# Patient Record
Sex: MALE | Race: WHITE
[De-identification: names, ages, dates, MRNs, and addresses within clinical notes are randomized per-mention and may not be internally consistent; named-entity substitution may affect disease eponyms.]

---

## 2019-07-04 ENCOUNTER — HOSPITAL ENCOUNTER (INPATIENT)
Dept: HOSPITAL 74 - JER | Age: 44
LOS: 4 days | Discharge: HOME | DRG: 501 | End: 2019-07-08
Attending: NURSE PRACTITIONER | Admitting: GENERAL ACUTE CARE HOSPITAL
Payer: SELF-PAY

## 2019-07-04 VITALS — BODY MASS INDEX: 38.2 KG/M2

## 2019-07-04 DIAGNOSIS — B95.4: ICD-10-CM

## 2019-07-04 DIAGNOSIS — E11.65: ICD-10-CM

## 2019-07-04 DIAGNOSIS — N50.819: ICD-10-CM

## 2019-07-04 DIAGNOSIS — B95.7: ICD-10-CM

## 2019-07-04 DIAGNOSIS — N52.9: ICD-10-CM

## 2019-07-04 DIAGNOSIS — N49.2: Primary | ICD-10-CM

## 2019-07-04 DIAGNOSIS — I10: ICD-10-CM

## 2019-07-04 LAB
ALBUMIN SERPL-MCNC: 3.1 G/DL (ref 3.4–5)
ALP SERPL-CCNC: 129 U/L (ref 45–117)
ALT SERPL-CCNC: 27 U/L (ref 13–61)
ANION GAP SERPL CALC-SCNC: 7 MMOL/L (ref 8–16)
APPEARANCE UR: CLEAR
AST SERPL-CCNC: 21 U/L (ref 15–37)
BILIRUB SERPL-MCNC: 0.4 MG/DL (ref 0.2–1)
BILIRUB UR STRIP.AUTO-MCNC: NEGATIVE MG/DL
BUN SERPL-MCNC: 13.2 MG/DL (ref 7–18)
CALCIUM SERPL-MCNC: 9 MG/DL (ref 8.5–10.1)
CHLORIDE SERPL-SCNC: 102 MMOL/L (ref 98–107)
CO2 SERPL-SCNC: 27 MMOL/L (ref 21–32)
COLOR UR: YELLOW
CREAT SERPL-MCNC: 1 MG/DL (ref 0.55–1.3)
DEPRECATED RDW RBC AUTO: 14 % (ref 11.9–15.9)
GLUCOSE SERPL-MCNC: 287 MG/DL (ref 74–106)
HCT VFR BLD CALC: 37.1 % (ref 35.4–49)
HGB BLD-MCNC: 12.7 GM/DL (ref 11.7–16.9)
KETONES UR QL STRIP: NEGATIVE
LEUKOCYTE ESTERASE UR QL STRIP.AUTO: NEGATIVE
MCH RBC QN AUTO: 27.6 PG (ref 25.7–33.7)
MCHC RBC AUTO-ENTMCNC: 34.1 G/DL (ref 32–35.9)
MCV RBC: 80.8 FL (ref 80–96)
NITRITE UR QL STRIP: NEGATIVE
PH UR: 5.5 [PH] (ref 5–8)
PLATELET # BLD AUTO: 291 K/MM3 (ref 134–434)
PMV BLD: 7.6 FL (ref 7.5–11.1)
POTASSIUM SERPLBLD-SCNC: 4.5 MMOL/L (ref 3.5–5.1)
PROT SERPL-MCNC: 7.4 G/DL (ref 6.4–8.2)
PROT UR QL STRIP: (no result)
PROT UR QL STRIP: NEGATIVE
RBC # BLD AUTO: 4.6 M/MM3 (ref 4–5.6)
SODIUM SERPL-SCNC: 136 MMOL/L (ref 136–145)
SP GR UR: 1.02 (ref 1.01–1.03)
UROBILINOGEN UR STRIP-MCNC: 1 MG/DL (ref 0.2–1)
WBC # BLD AUTO: 7.1 K/MM3 (ref 4–10)

## 2019-07-04 PROCEDURE — 0V950ZX DRAINAGE OF SCROTUM, OPEN APPROACH, DIAGNOSTIC: ICD-10-PCS | Performed by: UROLOGY

## 2019-07-04 RX ADMIN — INSULIN ASPART SCH UNITS: 100 INJECTION, SOLUTION INTRAVENOUS; SUBCUTANEOUS at 16:31

## 2019-07-04 RX ADMIN — PIPERACILLIN SODIUM,TAZOBACTAM SODIUM SCH MLS/HR: 3; .375 INJECTION, POWDER, FOR SOLUTION INTRAVENOUS at 19:06

## 2019-07-04 RX ADMIN — INSULIN ASPART SCH UNITS: 100 INJECTION, SOLUTION INTRAVENOUS; SUBCUTANEOUS at 22:25

## 2019-07-04 RX ADMIN — INSULIN ASPART SCH UNITS: 100 INJECTION, SOLUTION INTRAVENOUS; SUBCUTANEOUS at 17:49

## 2019-07-04 NOTE — HP
Admitting History and Physical





- Admission


Chief Complaint: swelling and pain in right testicle


History of Present Illness: 





44yo M hx DM, HTN, and obesity c/o R testicular swelling x8 days. Pt popped a 

small pimple on his R testicular sac 9 days ago with a needle (cleaned with 

alcohol). The following day his R sac swelled. His PCP prescirbed him 10 days 

of Amox-Clav 875-125 2x/day 7 days ago which pt has been taking consistently. 

Pt states the swelling and inflammation has decreased a little but it's become 

hard. Endorses pain when standing 2/2 weight and pain with palpation of the R 

sac, R sac swelling, mild redness and itching, and R groin pain. Denies penile 

pain, penile discharge, dysuria, hematuria, black/blue discoloration, abdominal 

pain, new erectile dysfunction (has hx of ED), STDs (last sex 2-3mo ago), other 

cysts/abscesses/bumps, hx of cysts/abscesses, hx of similar sx, recent abx use, 

F/C, N/V/D. Pt's girlfriend states pt was sweating one night a few nights ago 

so he possibly had a fever. Pt has tried tylenol and advil with minimal relief.


07/04/19 09:46


History Source: Patient


Limitations to Obtaining History: No Limitations





- Past Medical History


Cardiovascular: Yes: HTN


Renal/: Yes: Other (hx of Erectile Dysfunction)


Endocrine: Yes: Diabetes Mellitus





- Past Surgical History


Past Surgical History: Yes: Orchiectomy (left)





- Smoking History


Smoking history: Never smoked


Have you smoked in the past 12 months: No





- Social History


Usual Living Arrangement: Yes: With Spouse


Occupation: contractor


History of Recent Travel: No





Home Medications





- Allergies


Allergies/Adverse Reactions: 


 Allergies











Allergy/AdvReac Type Severity Reaction Status Date / Time


 


No Known Allergies Allergy   Verified 07/04/19 07:49














- Home Medications


Home Medications: 


Ambulatory Orders





Amlodipine Besylate 10 mg PO DAILY 07/04/19 


Glipizide [Glipizide ER] 10 mg PO DAILY 07/04/19 


Hydrochlorothiazide [Hctz -] 12.5 mg PO DAILY 07/04/19 


Metformin HCl [Glucophage] 1,000 mg PO BID 07/04/19 











Family Disease History





- Family Disease History


Family History: Denies





Review of Systems





- Review of Systems


Constitutional: reports: Chills (questionable 2 nights  ago-waife claims he had 

chills)


Eyes: reports: No Symptoms


HENT: reports: No Symptoms


Neck: reports: No Symptoms


Cardiovascular: reports: No Symptoms


Respiratory: reports: No Symptoms


Gastrointestinal: reports: No Symptoms


Genitourinary: reports: Testicular Pain, Testicular Swelling (right)


Breasts: reports: No Symptoms Reported


Musculoskeletal: reports: No Symptoms


Integumentary: reports: No Symptoms


Neurological: reports: No Symptoms


Endocrine: reports: No Symptoms


Hematology/Lymphatic: reports: No Symptoms


Psychiatric: reports: No Symptoms


Pain Intensity: 5





Physical Examination


Vital Signs: 


 Vital Signs











Temperature  98.8 F   07/04/19 07:51


 


Pulse Rate  100 H  07/04/19 07:51


 


Respiratory Rate  16   07/04/19 07:51


 


Blood Pressure  135/83   07/04/19 07:51


 


O2 Sat by Pulse Oximetry (%)  99   07/04/19 07:51











Constitutional: Yes: Well Nourished, No Distress, Calm


Eyes: Yes: WNL


HENT: Yes: WNL, Atraumatic, Normocephalic


Neck: Yes: WNL, Supple, Trachea Midline


Cardiovascular: Yes: WNL, Regular Rate and Rhythm


Respiratory: Yes: WNL, Regular, CTA Bilaterally


Gastrointestinal: Yes: WNL, Normal Bowel Sounds, Soft


...Rectal Exam: Yes: Deferred


Renal/: Yes: Scrotal Edema


Musculoskeletal: Yes: WNL


Extremities: Yes: WNL


Edema: No


Peripheral Pulses WNL: Yes


Integumentary: Yes: WNL


Neurological: Yes: WNL, Alert, Oriented


...Motor Strength: WNL


Psychiatric: Yes: WNL, Alert, Oriented


Labs: 


 CBC, BMP





 07/04/19 09:51 





 07/04/19 09:51 











Imaging





- Results


Ultrasound: Report Reviewed (U/S scrotum: The right testis is not visualized. 

The patient states a history of remote orchiectomy at age 4. Within the right 

scrotal sac laterally note is made of a nonspecific approximately 4.3 x 3 x 2 

cm complex structure possibly representing an abscess with internal debris. 

Increased vascularity is seen along the periphery of this complex structure. 

Correlate clinically. The left testis appears unremarkable. No Doppler evidence 

of left testicular torsion, sensitivity 85%. The left epididymis appears 

unremarkable. No hydrocele is noted.)





Problem List





- Problems


(1) Testicle pain


Code(s): N50.819 - TESTICULAR PAIN, UNSPECIFIED   





(2) Testicle swelling


Assessment/Plan: 


may apple ice to scrotum


elevate testicles


Code(s): N50.89 - OTHER SPECIFIED DISORDERS OF THE MALE GENITAL ORGANS   





(3) Testicular abscess


Assessment/Plan: 


 consultation requested


change abx to ceftriaxone 2g qd





Code(s): N45.4 - ABSCESS OF EPIDIDYMIS OR TESTIS   





(4) HTN (hypertension)


Assessment/Plan: 


normotensive


maintain on norvasc and hctz


Code(s): I10 - ESSENTIAL (PRIMARY) HYPERTENSION   





(5) Diabetes


Assessment/Plan: 


HgbA1c 10.9


BGM AC/qHS


novolog sliding scale


if BGM remains elevated consider starting long acting insulin


diabetic diet


diabetic counseling


Code(s): E11.9 - TYPE 2 DIABETES MELLITUS WITHOUT COMPLICATIONS   





(6) Hemoglobin A1c greater than 9.0%


Code(s): R73.09 - OTHER ABNORMAL GLUCOSE   





(7) Prophylactic measure


Assessment/Plan: 


FEN


diabetic diet


monitor electrolytes





DVT proph


early ambulation





Dispo


Maintain as in patient


full code


discharge planning


Code(s): Z29.9 - ENCOUNTER FOR PROPHYLACTIC MEASURES, UNSPECIFIED   





Visit type





- Emergency Visit


Emergency Visit: Yes


ED Registration Date: 07/04/19


Care time: The patient presented to the Emergency Department on the above date 

and was hospitalized for further evaluation of their emergent condition.





- New Patient


This patient is new to me today: Yes


Date on this admission: 07/04/19





- Critical Care


Critical Care patient: No

## 2019-07-04 NOTE — PROC
Procedure Note


Procedure: 





Incision and drainage of scrotal abscess





Under sterile conditions with local anesthesia using 1% lidocaine, right 

scrotal wall abscess was incised and drained with a #10 scalpel blade. Large 

amount of pus was expressed and drained.  Wound culture sent.

## 2019-07-04 NOTE — PDOC
Attending Attestation





- Resident


Resident Name: Kelly Pérez





- ED Attending Attestation


I have performed the following: I have examined & evaluated the patient, The 

case was reviewed & discussed with the resident, I agree w/resident's findings 

& plan, Exceptions are as noted





- HPI


HPI: 





07/04/19 09:42


Mr Brown is a 44 yo M with a h/o HTN. DM who presents to the ER with a 

complaint of scrotal swelling and pain


Pt reports that 10 days ago, he popped a bump on his scrutum with a needle


Since then, he has noted progressive worsening swelling and tenderness, 

particularly when he walks


He was seen by a PMD who initiated Augmentin which he has been on for the past 

week (intermittently he has missed doses)


He denies fevers but his life partner notes that he had chills and felt warm 

one night


Pain is mostly when walking


No dysuria, No discharge


No prior history of abscesses


No exposure to health care system


No nausea, vomiting, diarrhea


07/04/19 10:18





PMH: DM, HTN


PSH: Right orchiectomy age 4








- Physicial Exam


PE: 





07/04/19 09:28





GENERAL: The patient is in no acute distress.


ENT: Ears normal, nares patent, oropharynx clear without exudates.  Moist 

mucous membranes 


NECK: Normal range of motion, supple 


LUNGS: Breath sounds equal, clear to auscultation bilaterally.  No wheezes, and 

no crackles.


HEART:Regular rate and rhythm, normal S1 and S2 without murmur, rub or gallop.


ABDOMEN: Soft, nontender  


: Right scrotum FIRM, ERYTHEMATOUS, WARM, blanching erythema 5 cm in diameter)


NO necrosis


NO lesions noted in perineum


Cremasteric reflex intact. Normal penis. No L scrotal mass, urethral discharge, 

penile lesion


EXTREMITIES: Normal range of motion, no edema.   


NEUROLOGICAL: Cranial nerves II through XII grossly intact.  Normal speech.  No 

focal neurological deficits. 


SKIN: see above


07/04/19 09:53





07/04/19 09:54





07/04/19 10:19


 





- Medical Decision Making





07/04/19 09:53


44 yo M p/w right scrotal swelling and erythema which has worsened despite abx


Local abscess noted


Will do:


Labs


Cultures


Scrotal US


Consult 


(Pt concerning given DM)





07/04/19 10:18


 Laboratory Tests











  07/04/19





  09:51


 


WBC  7.1


 


Hgb  12.7


 


Hct  37.1


 


Plt Count  291











07/04/19 10:43


U/S demonstrates 4.3 x 3 x 2cm complex structure likely representing an abscess 

with internal debris, increased vascularity seen along the periphery of this 

structure, no evidence of torsion


07/04/19 10:44


 Laboratory Tests











  07/04/19





  09:51


 


BUN  13.2


 


Creatinine  1.0


 


Random Glucose  287 H








 consult


Recommends admission and IV abx


Pt agrees with this plan


Will admit





clinical impression: scrotal abscess, initial presentation


Scrotal cellulitis, initial presentation


Failure of outpatient abx, initial presentation

## 2019-07-04 NOTE — PDOC
History of Present Illness





- General


Chief Complaint: Abscess Boil


Stated Complaint: TESTICLE PAIN AND SWELLING


Time Seen by Provider: 07/04/19 09:02





- History of Present Illness


Initial Comments: 





07/04/19 09:35


44yo M hx DM, HTN, and obesity c/o R testicular swelling x8 days. Pt popped a 

small pimple on his R testicular sac 9 days ago with a needle (cleaned with 

alcohol). The following day his R sac swelled. His PCP prescirbed him 10 days 

of Amox-Clav 875-125 2x/day 7 days ago which pt has been taking consistently. 

Pt states the swelling and inflammation has decreased a little but it's become 

hard. Endorses pain when standing 2/2 weight and pain with palpation of the R 

sac, R sac swelling, mild redness and itching, and R groin pain. Denies penile 

pain, penile discharge, dysuria, hematuria, black/blue discoloration, abdominal 

pain, new erectile dysfunction (has hx of ED), STDs (last sex 2-3mo ago), other 

cysts/abscesses/bumps, hx of cysts/abscesses, hx of similar sx, recent abx use, 

F/C, N/V/D. Pt's girlfriend states pt was sweating one night a few nights ago 

so he possibly had a fever. Pt has tried tylenol and advil with minimal relief.


07/04/19 09:46








Past History





- Past Medical History


Allergies/Adverse Reactions: 


 Allergies











Allergy/AdvReac Type Severity Reaction Status Date / Time


 


No Known Allergies Allergy   Verified 07/04/19 07:49











Home Medications: 


Ambulatory Orders





Amlodipine Besylate 10 mg PO DAILY 07/04/19 


Glipizide [Glipizide ER] 10 mg PO DAILY 07/04/19 


Hydrochlorothiazide [Hctz -] 12.5 mg PO DAILY 07/04/19 


Metformin HCl [Glucophage] 1,000 mg PO BID 07/04/19 








COPD: No


Diabetes: Yes


HTN: Yes





- Immunization History


Immunization Up to Date: No





- Suicide/Smoking/Psychosocial Hx


Smoking History: Never smoked


Have you smoked in the past 12 months: No


Information on smoking cessation initiated: No


Hx Alcohol Use: No


Drug/Substance Use Hx: No





**Review of Systems





- Review of Systems


Comments:: 





07/04/19 09:53


Constitutional: Positive for diaphoresis. Negative for fever, chills, fatigue. 


HENT: Negative for sore throat, rhinorrhea, congestion.


Eyes: Negative for visual disturbance. 


Respiratory: Negative for shortness of breath, cough, and wheezing. 


Cardiovascular: Negative for chest pain, palpitations and leg swelling. 


Gastrointestinal: Negative for abdominal pain, blood in stool, constipation, 

diarrhea, nausea and vomiting. 


Genitourinary: Positive for R testicular swelling, erythema, pruritus, and 

pain. Negative for dysuria, hematuria, penile discharge, penile pain, flank 

pain. 


Musculoskeletal: Negative for back pain, neck pain, myalgias.


Skin: Positive for erythema overlying R testicle. 


Neurological: Negative for light-headedness, dizziness, syncope, weakness, 

numbness and headaches. 


Psychiatric/Behavioral: Negative for behavioral problems and confusion. 





*Physical Exam





- Vital Signs


 Last Vital Signs











Temp Pulse Resp BP Pulse Ox


 


 98.8 F   100 H  16   135/83   99 


 


 07/04/19 07:51  07/04/19 07:51  07/04/19 07:51  07/04/19 07:51  07/04/19 07:51














- Physical Exam


Comments: 





07/04/19 09:56


GEN: Alert, NAD, comfortable-appearing, obese.


HEENT: PERRL, EOMI. MMM. Oropharynx clear, pink, and moist.


PULM: No resp distress. CTAB, no wheezes, rales, or rhonchi.


CV: Regular rate and rhythm. No murmurs, rubs, or gallops.


ABD: Soft, non-distended, no TTP. Normoactive BS. No rebound tenderness or 

guarding. No CVA tenderness.


: R scrotal edema with 2" diameter erythema and induration, TTP R scrotum. 

Cremasteric reflex intact. 2+ femoral pulses. Normal penis. No L scrotal mass, 

urethral discharge, penile lesion, varicocele, blistering, blue/black/grey 

discoloration, or evidence of Rosalind gangrene.


MSK: No gross deformities.


BACK: no TTP of c/t/l-spine.


NEURO: AAOx3. PERRL. No gross CN deficits. Strength and sensation grossly 

intact throughout.


PSYCH: Normal mood and thought pattern.


SKIN: Erythema overlying R testicle.





07/04/19 10:10








ED Treatment Course





- LABORATORY


CBC & Chemistry Diagram: 


 07/04/19 09:51





 07/04/19 09:51





- RADIOLOGY


Radiology Studies Ordered: 














 Category Date Time Status


 


 SCROTUM AND CONTENTS US [US] Stat Ultrasound  07/04/19 09:32 Ordered














Medical Decision Making





- Medical Decision Making





07/04/19 09:49


44yo M hx DM, HTN, and obesity presents with worsening R scrotal edema, 

induration, and cellulitis x8 days despite 7 days of Amox-Clav. No evidence of 

testicular torsion, Rosalind gangrene, or sepsis. Most likely cyst/abscess - 

get scrotal U/S to evaluate extent and basic labs and BCx to rule out 

bacteremia.


- Morphine for pain


- Labs: CBC, CMP, UA/UC, BCx


- Scrotal U/S


- Pending lab results, consult Urology to discuss IV abx, I&D, and dispo


07/04/19 10:17





07/04/19 11:02


U/S scrotum: The right testis is not visualized. The patient states a history 

of remote orchiectomy at age 4. Within the right scrotal sac laterally note is 

made of a nonspecific approximately 4.3 x 3 x 2 cm complex structure possibly 

representing an abscess with internal debris. Increased vascularity is seen 

along the periphery of this complex structure. Correlate clinically. The left 

testis appears unremarkable. No Doppler evidence of left testicular torsion, 

sensitivity 85%. The left epididymis appears unremarkable. No hydrocele is 

noted. 


CBC/CMP resulted: of note, WBC 7.1, gluc 287


1103: urine sample taken. Nurse with pt to give morphine.


07/04/19 11:32


UA returned: 3+ glucose


Pt feeling and appearing better s/p morphine.


07/04/19 12:13


Talked to ID. Recommended 1g Vanc and 3.375 Zosyn. Ordered. Waiting for call 

from admitting hospitalist.


07/04/19 12:23


Talked to [facility] NP for admission. Order to admit placed.








*DC/Admit/Observation/Transfer


Diagnosis at time of Disposition: 


 Abscess








- Discharge Dispostion


Condition at time of disposition: Fair


Decision to Admit order: Yes





- Referrals





- Patient Instructions





- Post Discharge Activity

## 2019-07-05 LAB
ALBUMIN SERPL-MCNC: 1.4 G/DL (ref 3.4–5)
ALP SERPL-CCNC: 120 U/L (ref 45–117)
ALT SERPL-CCNC: 25 U/L (ref 13–61)
ANION GAP SERPL CALC-SCNC: 9 MMOL/L (ref 8–16)
AST SERPL-CCNC: 12 U/L (ref 15–37)
BASOPHILS # BLD: 1 % (ref 0–2)
BILIRUB SERPL-MCNC: 0.5 MG/DL (ref 0.2–1)
BUN SERPL-MCNC: 19.5 MG/DL (ref 7–18)
CALCIUM SERPL-MCNC: 8.9 MG/DL (ref 8.5–10.1)
CHLORIDE SERPL-SCNC: 101 MMOL/L (ref 98–107)
CO2 SERPL-SCNC: 25 MMOL/L (ref 21–32)
CREAT SERPL-MCNC: 1.1 MG/DL (ref 0.55–1.3)
DEPRECATED RDW RBC AUTO: 14.1 % (ref 11.9–15.9)
EOSINOPHIL # BLD: 1.2 % (ref 0–4.5)
GLUCOSE SERPL-MCNC: 176 MG/DL (ref 74–106)
HCT VFR BLD CALC: 36.1 % (ref 35.4–49)
HGB BLD-MCNC: 12.4 GM/DL (ref 11.7–16.9)
INR BLD: 1.06 (ref 0.83–1.09)
LYMPHOCYTES # BLD: 29.5 % (ref 8–40)
MAGNESIUM SERPL-MCNC: 2.4 MG/DL (ref 1.8–2.4)
MCH RBC QN AUTO: 27.8 PG (ref 25.7–33.7)
MCHC RBC AUTO-ENTMCNC: 34.3 G/DL (ref 32–35.9)
MCV RBC: 81 FL (ref 80–96)
MONOCYTES # BLD AUTO: 6.2 % (ref 3.8–10.2)
NEUTROPHILS # BLD: 62.1 % (ref 42.8–82.8)
PLATELET # BLD AUTO: 278 K/MM3 (ref 134–434)
PMV BLD: 7.7 FL (ref 7.5–11.1)
POTASSIUM SERPLBLD-SCNC: 4.4 MMOL/L (ref 3.5–5.1)
PROT SERPL-MCNC: 7 G/DL (ref 6.4–8.2)
PT PNL PPP: 12.5 SEC (ref 9.7–13)
RBC # BLD AUTO: 4.46 M/MM3 (ref 4–5.6)
SODIUM SERPL-SCNC: 135 MMOL/L (ref 136–145)
WBC # BLD AUTO: 5.9 K/MM3 (ref 4–10)

## 2019-07-05 RX ADMIN — INSULIN ASPART SCH UNITS: 100 INJECTION, SOLUTION INTRAVENOUS; SUBCUTANEOUS at 06:08

## 2019-07-05 RX ADMIN — AMLODIPINE BESYLATE SCH MG: 10 TABLET ORAL at 09:12

## 2019-07-05 RX ADMIN — PIPERACILLIN SODIUM,TAZOBACTAM SODIUM SCH MLS/HR: 3; .375 INJECTION, POWDER, FOR SOLUTION INTRAVENOUS at 02:02

## 2019-07-05 RX ADMIN — PIPERACILLIN SODIUM,TAZOBACTAM SODIUM SCH MLS/HR: 3; .375 INJECTION, POWDER, FOR SOLUTION INTRAVENOUS at 18:15

## 2019-07-05 RX ADMIN — INSULIN ASPART SCH UNITS: 100 INJECTION, SOLUTION INTRAVENOUS; SUBCUTANEOUS at 21:59

## 2019-07-05 RX ADMIN — GLIPIZIDE SCH MG: 10 TABLET, FILM COATED, EXTENDED RELEASE ORAL at 09:31

## 2019-07-05 RX ADMIN — METFORMIN HYDROCHLORIDE SCH MG: 500 TABLET ORAL at 18:14

## 2019-07-05 RX ADMIN — INSULIN ASPART SCH UNITS: 100 INJECTION, SOLUTION INTRAVENOUS; SUBCUTANEOUS at 18:18

## 2019-07-05 RX ADMIN — PIPERACILLIN SODIUM,TAZOBACTAM SODIUM SCH MLS/HR: 3; .375 INJECTION, POWDER, FOR SOLUTION INTRAVENOUS at 11:15

## 2019-07-05 RX ADMIN — HYDROCHLOROTHIAZIDE SCH MG: 12.5 CAPSULE ORAL at 09:12

## 2019-07-05 RX ADMIN — INSULIN ASPART SCH UNITS: 100 INJECTION, SOLUTION INTRAVENOUS; SUBCUTANEOUS at 12:21

## 2019-07-05 RX ADMIN — VANCOMYCIN HYDROCHLORIDE SCH MLS/2 HR: 1.25 INJECTION, POWDER, LYOPHILIZED, FOR SOLUTION INTRAVENOUS at 14:12

## 2019-07-05 NOTE — PN
Progress Note, Physician


History of Present Illness: 





42yo M hx DM, HTN, and obesity c/o R testicular swelling x8 days. Pt popped a 

small pimple on his R scrotum 9 days ago with a needle (cleaned with alcohol). 

The following day his R scrotum swelled. His PCP prescirbed him 10 days of Amox-

Clav 875-125 2x/day 7 days ago which pt has been taking consistently. Pt states 

the swelling and inflammation has decreased a little but it's become hard. 

Endorses pain when standing 2/2 weight and pain with palpation of the R scrotum

, swelling, mild redness and itching, and R groin pain. Denies penile pain, 

penile discharge, dysuria, hematuria, black/blue discoloration, abdominal pain, 

new erectile dysfunction (has hx of ED), STDs (last sex 2-3mo ago), other cysts/

abscesses/bumps, hx of cysts/abscesses, hx of similar sx, recent abx use, F/C, N

/V/D. Wife states pt was sweating one night a few nights ago so he possibly had 

a fever. Pt has tried tylenol and advil with minimal relief.





- Current Medication List


Current Medications: 


Active Medications





Amlodipine Besylate (Norvasc -)  10 mg PO DAILY [facility]


Hydrochlorothiazide (Hctz -)  12.5 mg PO DAILY [facility]


Piperacillin Sod/Tazobactam (Sod 3.375 gm/ Dextrose)  50 mls @ 100 mls/hr IVPB 

Q8H-IV [facility]; Protocol


Piperacillin Sod/Tazobactam (Sod 3.375 gm/ Dextrose)  50 mls @ 100 mls/hr IVPB 

Q8H-IV [facility]


   Stop: 07/05/19 10:29


   Last Admin: 07/05/19 02:02 Dose:  100 mls/hr


Insulin Aspart (Novolog Vial Sliding Scale -)  1 vial SQ ACHS [facility]; Protocol


   Last Admin: 07/05/19 06:08 Dose:  8 units


Ketorolac Tromethamine (Toradol Injection -)  30 mg IVPUSH Q6H PRN


   PRN Reason: PAIN LEVEL 4 - 6


   Last Admin: 07/04/19 20:35 Dose:  30 mg


Vancomycin HCl (Vancomycin (Pre-Docked))  1,000 mg IVPB BID [facility]; Protocol











- Objective


Vital Signs: 


 Vital Signs











Temperature  98.5 F   07/05/19 06:00


 


Pulse Rate  72   07/05/19 06:00


 


Respiratory Rate  18   07/05/19 06:00


 


Blood Pressure  110/77   07/05/19 06:00


 


O2 Sat by Pulse Oximetry (%)  97   07/04/19 14:00











Constitutional: Yes: Well Nourished, No Distress, Calm


Eyes: Yes: WNL, Conjunctiva Clear, EOM Intact


HENT: Yes: WNL, Atraumatic, Normocephalic


Neck: Yes: WNL, Supple, Trachea Midline


Cardiovascular: Yes: WNL, Regular Rate and Rhythm


Respiratory: Yes: WNL, Regular, CTA Bilaterally


Gastrointestinal: Yes: WNL, Normal Bowel Sounds, Soft


...Rectal Exam: Yes: Deferred


Genitourinary: Yes: Other (Right scrotum I&D-small incision draining serosang 

fluid)


Musculoskeletal: Yes: WNL


Extremities: Yes: WNL


Edema: No


Peripheral Pulses WNL: Yes


Integumentary: Yes: Incision


Wound/Incision: Yes: Draining (sersanguinous drainage)


Neurological: Yes: WNL, Alert, Oriented


Labs: 


 INR, PTT











INR  1.06  (0.83-1.09)   07/05/19  06:31    














Problem List





- Problems


(1) Testicle pain


Code(s): N50.819 - TESTICULAR PAIN, UNSPECIFIED   





(2) Testicle swelling


Assessment/Plan: 


may apple ice to scrotum


elevate testicles


Code(s): N50.89 - OTHER SPECIFIED DISORDERS OF THE MALE GENITAL ORGANS   





(3) Testicular abscess


Assessment/Plan: 


S/P I&D or right scrotal abcess


continue abx


appreciate ID consultation


ABD pads to collect drainage , but leave open-no tape.


Code(s): N45.4 - ABSCESS OF EPIDIDYMIS OR TESTIS   





(4) HTN (hypertension)


Assessment/Plan: 


normotensive


maintain on norvasc and hctz


Code(s): I10 - ESSENTIAL (PRIMARY) HYPERTENSION   





(5) Diabetes


Assessment/Plan: 


HgbA1c 10.9


BGM AC/qHS


novolog sliding scale


restart metformin and glipizide


diabetic diet


diabetic counseling


Code(s): E11.9 - TYPE 2 DIABETES MELLITUS WITHOUT COMPLICATIONS   





(6) Hemoglobin A1c greater than 9.0%


Code(s): R73.09 - OTHER ABNORMAL GLUCOSE   





(7) Prophylactic measure


Assessment/Plan: 


FEN


diabetic diet


monitor electrolytes





DVT proph


early ambulation





Dispo


Maintain as in patient


full code


discharge planning


Code(s): Z29.9 - ENCOUNTER FOR PROPHYLACTIC MEASURES, UNSPECIFIED   





Visit type





- Emergency Visit


Emergency Visit: Yes


ED Registration Date: 07/04/19


Care time: The patient presented to the Emergency Department on the above date 

and was hospitalized for further evaluation of their emergent condition.





- New Patient


This patient is new to me today: No





- Critical Care


Critical Care patient: No





- Discharge Referral


Referred to [facility] No

## 2019-07-05 NOTE — CON.ID
Consult


Consult Specialty:: infectious diseases


Reason for Consultation:: scrotal abscess





- History of Present Illness


Chief Complaint: pain and swelling of the scrotum


History of Present Illness: 





44yo M hx DM, HTN, and obesity c/o R testicular swelling x8 days. Pt popped a 

small pimple on his R testicular sac 9 days ago with a needle (cleaned with 

alcohol). The following day his R sac swelled. His PCP prescirbed him 10 days 

of Amox-Clav 875-125 2x/day 7 days ago which pt has been taking consistently. 

Pt states the swelling and inflammation has decreased a little but it's become 

hard. 


patient didnot improve,came to the hospital and was taken to the operating room 

and underwent draiange of the abscess


currently he feels well and stable


also his blood cx are now growing bacteria





- History Source


History Provided By: Patient


Limitations to Obtaining History: No Limitations





- Past Medical History


Cardio/Vascular: Yes: HTN


Renal/: Yes: Other (hx of Erectile Dysfunction)


Endocrine: Yes: Diabetes Mellitus





- Past Surgical History


Past Surgical History: Yes: Orchiectomy (left)





- Alcohol/Substance Use


Hx Alcohol Use: No





- Smoking History


Smoking history: Never smoked


Have you smoked in the past 12 months: No





- Social History


Occupation: contractor


History of Recent Travel: No





Home Medications





- Allergies


Allergies/Adverse Reactions: 


 Allergies











Allergy/AdvReac Type Severity Reaction Status Date / Time


 


No Known Allergies Allergy   Verified 07/04/19 07:49














- Home Medications


Home Medications: 


Ambulatory Orders





Amlodipine Besylate 10 mg PO DAILY 07/04/19 


Glipizide [Glipizide ER] 10 mg PO DAILY 07/04/19 


Hydrochlorothiazide [Hctz -] 12.5 mg PO DAILY 07/04/19 


Metformin HCl [Glucophage] 1,000 mg PO BID 07/04/19 











Review of Systems





- Review of Systems


Constitutional: reports: No Symptoms


Eyes: reports: No Symptoms


HENT: reports: No Symptoms


Neck: reports: No Symptoms


Cardiovascular: reports: No Symptoms


Respiratory: reports: No Symptoms


Gastrointestinal: reports: No Symptoms, Other (scrotal infection/abscess)


Genitourinary: reports: Other (scrotal swelling/abscess)


Musculoskeletal: reports: No Symptoms


Integumentary: reports: No Symptoms


Neurological: reports: No Symptoms


Endocrine: reports: No Symptoms


Hematology/Lymphatic: reports: No Symptoms


Psychiatric: reports: No Symptoms





Physical Exam


Vital Signs: 


 Vital Signs











Temperature  98.4 F   07/05/19 10:00


 


Pulse Rate  79   07/05/19 10:00


 


Respiratory Rate  18   07/05/19 10:00


 


Blood Pressure  114/85   07/05/19 10:00


 


O2 Sat by Pulse Oximetry (%)  97   07/04/19 14:00











Constitutional: Yes: Well Nourished, No Distress, Calm


Cardiovascular: Yes: Regular Rate and Rhythm


Respiratory: Yes: Regular, CTA Bilaterally


Gastrointestinal: Yes: Normal Bowel Sounds, Soft


Renal/: Yes: Scrotal Edema (resolved.wound looks good)


Musculoskeletal: Yes: WNL


Extremities: Yes: WNL


Wound/Incision: Yes: Clean/Dry, Open to air


Neurological: Yes: Alert, Oriented


Psychiatric: Yes: Alert, Oriented


Labs: 


 CBC, BMP





 07/05/19 06:31 





 07/05/19 06:31 











Imaging





- Results


Ultrasound: Report Reviewed, Image Reviewed





Assessment/Plan





Problem List





- Problems


(1) Testicle pain


Code(s): N50.819 - TESTICULAR PAIN, UNSPECIFIED   





(2) Testicle swelling


Code(s): N50.89 - OTHER SPECIFIED DISORDERS OF THE MALE GENITAL ORGANS   





(3) Testicular abscess


Code(s): N45.4 - ABSCESS OF EPIDIDYMIS OR TESTIS   





(4) HTN (hypertension)


Code(s): I10 - ESSENTIAL (PRIMARY) HYPERTENSION   





(5) Diabetes


Code(s): E11.9 - TYPE 2 DIABETES MELLITUS WITHOUT COMPLICATIONS   





(6) Hemoglobin A1c greater than 9.0%


Code(s): R73.09 - OTHER ABNORMAL GLUCOSE   








plan


continue abx


await for cx report to be back


close watch


rest as per the team


will repeat blood cx tomorrow

## 2019-07-06 LAB
ALBUMIN SERPL-MCNC: 3 G/DL (ref 3.4–5)
ALP SERPL-CCNC: 122 U/L (ref 45–117)
ALT SERPL-CCNC: 29 U/L (ref 13–61)
ANION GAP SERPL CALC-SCNC: 9 MMOL/L (ref 8–16)
AST SERPL-CCNC: 13 U/L (ref 15–37)
BASOPHILS # BLD: 0.9 % (ref 0–2)
BILIRUB SERPL-MCNC: 0.4 MG/DL (ref 0.2–1)
BUN SERPL-MCNC: 14.5 MG/DL (ref 7–18)
CALCIUM SERPL-MCNC: 8.9 MG/DL (ref 8.5–10.1)
CHLORIDE SERPL-SCNC: 103 MMOL/L (ref 98–107)
CO2 SERPL-SCNC: 26 MMOL/L (ref 21–32)
CREAT SERPL-MCNC: 0.9 MG/DL (ref 0.55–1.3)
DEPRECATED RDW RBC AUTO: 13.8 % (ref 11.9–15.9)
EOSINOPHIL # BLD: 1.3 % (ref 0–4.5)
GLUCOSE SERPL-MCNC: 196 MG/DL (ref 74–106)
HCT VFR BLD CALC: 37.8 % (ref 35.4–49)
HGB BLD-MCNC: 12.9 GM/DL (ref 11.7–16.9)
LYMPHOCYTES # BLD: 24.2 % (ref 8–40)
MAGNESIUM SERPL-MCNC: 2.3 MG/DL (ref 1.8–2.4)
MCH RBC QN AUTO: 27.8 PG (ref 25.7–33.7)
MCHC RBC AUTO-ENTMCNC: 34.2 G/DL (ref 32–35.9)
MCV RBC: 81.4 FL (ref 80–96)
MONOCYTES # BLD AUTO: 5.9 % (ref 3.8–10.2)
NEUTROPHILS # BLD: 67.7 % (ref 42.8–82.8)
PLATELET # BLD AUTO: 294 K/MM3 (ref 134–434)
PMV BLD: 7.6 FL (ref 7.5–11.1)
POTASSIUM SERPLBLD-SCNC: 4.2 MMOL/L (ref 3.5–5.1)
PROT SERPL-MCNC: 7.2 G/DL (ref 6.4–8.2)
RBC # BLD AUTO: 4.64 M/MM3 (ref 4–5.6)
SODIUM SERPL-SCNC: 137 MMOL/L (ref 136–145)
WBC # BLD AUTO: 6.5 K/MM3 (ref 4–10)

## 2019-07-06 RX ADMIN — METFORMIN HYDROCHLORIDE SCH MG: 500 TABLET ORAL at 18:18

## 2019-07-06 RX ADMIN — INSULIN ASPART SCH UNITS: 100 INJECTION, SOLUTION INTRAVENOUS; SUBCUTANEOUS at 06:00

## 2019-07-06 RX ADMIN — INSULIN ASPART SCH UNITS: 100 INJECTION, SOLUTION INTRAVENOUS; SUBCUTANEOUS at 22:44

## 2019-07-06 RX ADMIN — METFORMIN HYDROCHLORIDE SCH MG: 500 TABLET ORAL at 06:07

## 2019-07-06 RX ADMIN — VANCOMYCIN HYDROCHLORIDE SCH MLS/2 HR: 1.25 INJECTION, POWDER, LYOPHILIZED, FOR SOLUTION INTRAVENOUS at 15:15

## 2019-07-06 RX ADMIN — INSULIN ASPART SCH UNITS: 100 INJECTION, SOLUTION INTRAVENOUS; SUBCUTANEOUS at 11:13

## 2019-07-06 RX ADMIN — GLIPIZIDE SCH MG: 10 TABLET, FILM COATED, EXTENDED RELEASE ORAL at 06:07

## 2019-07-06 RX ADMIN — HYDROCHLOROTHIAZIDE SCH MG: 12.5 CAPSULE ORAL at 11:10

## 2019-07-06 RX ADMIN — PIPERACILLIN SODIUM,TAZOBACTAM SODIUM SCH MLS/HR: 3; .375 INJECTION, POWDER, FOR SOLUTION INTRAVENOUS at 11:10

## 2019-07-06 RX ADMIN — INSULIN ASPART SCH UNITS: 100 INJECTION, SOLUTION INTRAVENOUS; SUBCUTANEOUS at 18:18

## 2019-07-06 RX ADMIN — AMLODIPINE BESYLATE SCH MG: 10 TABLET ORAL at 11:10

## 2019-07-06 RX ADMIN — PIPERACILLIN SODIUM,TAZOBACTAM SODIUM SCH MLS/HR: 3; .375 INJECTION, POWDER, FOR SOLUTION INTRAVENOUS at 01:47

## 2019-07-06 NOTE — PN
Progress Note (short form)





- Note


Progress Note: 





Afebrile


wbc normal





scrotal I and D site is clean and healing with serosanguinous drainage








Imp-


Scrotal abscess S/P I&D


wound and blood cultures are positive


abx plan as per ID

## 2019-07-06 NOTE — PN
Progress Note, Physician


History of Present Illness: 





Pt states he feels well, has less pain. Remains afebrile. No other specific 

complaints.





- Current Medication List


Current Medications: 


Active Medications





Amlodipine Besylate (Norvasc -)  10 mg PO DAILY [facility]


   Last Admin: 07/06/19 11:10 Dose:  10 mg


Glipizide (Glucotrol Xl -)  10 mg PO 0700 [facility]


   Last Admin: 07/06/19 06:07 Dose:  10 mg


Hydrochlorothiazide (Hctz -)  12.5 mg PO DAILY [facility]


   Last Admin: 07/06/19 11:10 Dose:  12.5 mg


Vancomycin HCl 1,250 mg/ (Dextrose)  250 mls @ 250 mls/2 hr IVPB Q24H [facility]; 

Protocol


   Last Admin: 07/05/19 14:12 Dose:  250 mls/2 hr


Piperacillin Sod/Tazobactam (Sod 3.375 gm/ Dextrose)  50 mls @ 100 mls/hr IVPB 

Q8H-IV [facility]; Protocol


   Last Admin: 07/06/19 11:10 Dose:  100 mls/hr


Insulin Aspart (Novolog Vial Sliding Scale -)  1 vial SQ ACHS [facility]; Protocol


   Last Admin: 07/06/19 11:13 Dose:  8 units


Ketorolac Tromethamine (Toradol Injection -)  30 mg IVPUSH Q6H PRN


   PRN Reason: PAIN LEVEL 4 - 6


   Last Admin: 07/04/19 20:35 Dose:  30 mg


Metformin HCl (Glucophage -)  1,000 mg PO BIDAC [facility]


   Last Admin: 07/06/19 06:07 Dose:  1,000 mg











- Objective


Vital Signs: 


 Vital Signs











Temperature  98.7 F   07/06/19 10:00


 


Pulse Rate  86   07/06/19 10:00


 


Respiratory Rate  18   07/06/19 10:00


 


Blood Pressure  124/81   07/06/19 10:00


 


O2 Sat by Pulse Oximetry (%)  97   07/04/19 14:00











Constitutional: Yes: No Distress, Calm


Cardiovascular: Yes: Regular Rate and Rhythm


Respiratory: Yes: Regular


Gastrointestinal: Yes: Normal Bowel Sounds, Soft, Abdomen, Obese


Genitourinary: Yes: WNL, Other (hx of Lt orchiectomy)


Wound/Incision: Yes: Other (Rt testicular wound with serosanguinous drainage, 

less testicular edema/tenderness)


Psychiatric: Yes: Alert, Oriented


Labs: 


 CBC, BMP





 07/06/19 06:55 





 07/06/19 06:55 





 INR, PTT











INR  1.06  (0.83-1.09)   07/05/19  06:31    








 Microbiology





07/04/19 09:51   Blood - Peripheral Venous   Blood Culture - Preliminary


                            NO GROWTH OBTAINED AFTER 48 HOURS, INCUBATION TO 

CONTINUE


                            FOR 3 DAYS.


07/04/19 20:00   Scrotum   Gram Stain - Final


07/04/19 20:00   Scrotum   Wound Culture - Preliminary


                            Staphylococcus Coagulase Neg


                            Staphylococcus Coagulase Neg#2


                            Pending Organism


07/04/19 09:51   Blood - Peripheral Venous   Blood Culture - Preliminary


                            Staphylococcus Coagulase Neg


07/04/19 11:04   Urine - Urine Clean Catch   Urine Culture - Final


                            NO GROWTH OBTAINED











Problem List





- Problems


(1) Diabetes


Code(s): E11.9 - TYPE 2 DIABETES MELLITUS WITHOUT COMPLICATIONS   





(2) HTN (hypertension)


Code(s): I10 - ESSENTIAL (PRIMARY) HYPERTENSION   





(3) Testicular abscess


Code(s): N45.4 - ABSCESS OF EPIDIDYMIS OR TESTIS   





Assessment/Plan





Testicular abscess s/p I+D


Uncontrolled DM


HTN





-- Initial Blood cultures coag. neg Staph, (usually contaminant) f/u repeat 

blood cultures and final wound culture results


-- continue Vancomycin for now, d/c Zosyn


-- continue wound care


-- control blood glucose

## 2019-07-06 NOTE — PN
Progress Note, Physician


History of Present Illness: 


42yo M hx DM, HTN, and obesity c/o R testicular swelling x8 days. Pt popped a 

small pimple on his R scrotum 9 days ago with a needle (cleaned with alcohol). 

The following day his R scrotum swelled. His PCP prescirbed him 10 days of Amox-

Clav 875-125 2x/day 7 days ago which pt has been taking consistently. Pt states 

the swelling and inflammation has decreased a little but it's become hard. 

Endorses pain when standing 2/2 weight and pain with palpation of the R scrotum

, swelling, mild redness and itching, and R groin pain. Denies penile pain, 

penile discharge, dysuria, hematuria, black/blue discoloration, abdominal pain, 

new erectile dysfunction (has hx of ED), STDs (last sex 2-3mo ago), other cysts/

abscesses/bumps, hx of cysts/abscesses, hx of similar sx, recent abx use, F/C, N

/V/D. Wife states pt was sweating one night a few nights ago so he possibly had 

a fever. Pt has tried tylenol and advil with minimal relief.








- Current Medication List


Current Medications: 


Active Medications





Amlodipine Besylate (Norvasc -)  10 mg PO DAILY [facility]


   Last Admin: 07/05/19 09:12 Dose:  10 mg


Glipizide (Glucotrol Xl -)  10 mg PO 0700 [facility]


   Last Admin: 07/06/19 06:07 Dose:  10 mg


Hydrochlorothiazide (Hctz -)  12.5 mg PO DAILY [facility]


   Last Admin: 07/05/19 09:12 Dose:  12.5 mg


Vancomycin HCl 1,250 mg/ (Dextrose)  250 mls @ 250 mls/2 hr IVPB Q24H [facility]; 

Protocol


   Last Admin: 07/05/19 14:12 Dose:  250 mls/2 hr


Piperacillin Sod/Tazobactam (Sod 3.375 gm/ Dextrose)  50 mls @ 100 mls/hr IVPB 

Q8H-IV [facility]; Protocol


   Last Admin: 07/06/19 01:47 Dose:  100 mls/hr


Insulin Aspart (Novolog Vial Sliding Scale -)  1 vial SQ ACHS [facility]; Protocol


   Last Admin: 07/06/19 06:00 Dose:  6 units


Ketorolac Tromethamine (Toradol Injection -)  30 mg IVPUSH Q6H PRN


   PRN Reason: PAIN LEVEL 4 - 6


   Last Admin: 07/04/19 20:35 Dose:  30 mg


Metformin HCl (Glucophage -)  1,000 mg PO BIDAC [facility]


   Last Admin: 07/06/19 06:07 Dose:  1,000 mg











- Objective


Vital Signs: 


 Vital Signs











Temperature  97.9 F   07/06/19 02:00


 


Pulse Rate  82   07/06/19 02:00


 


Respiratory Rate  18   07/06/19 02:00


 


Blood Pressure  108/71   07/06/19 02:00


 


O2 Sat by Pulse Oximetry (%)  97   07/04/19 14:00











Constitutional: Yes: Well Nourished, No Distress, Calm


Eyes: Yes: WNL, Conjunctiva Clear, EOM Intact


HENT: Yes: Normocephalic


Neck: Yes: WNL, Supple, Trachea Midline


Cardiovascular: Yes: WNL, Regular Rate and Rhythm


Respiratory: Yes: WNL, Regular, CTA Bilaterally


Gastrointestinal: Yes: WNL, Normal Bowel Sounds, Soft


...Rectal Exam: Yes: Deferred


Genitourinary: Yes: Other (Right scrotal I & D- small incision draining 

serosang fluid)


Musculoskeletal: Yes: WNL


Extremities: Yes: WNL


Edema: No


Peripheral Pulses WNL: Yes


Integumentary: Yes: Incision


Wound/Incision: Yes: Well Approximated, Other (wound draining serosang fluid)


Neurological: Yes: WNL, Alert, Oriented


Labs: 


 CBC, BMP





 07/06/19 06:55 





 07/06/19 06:55 





 INR, PTT











INR  1.06  (0.83-1.09)   07/05/19  06:31    














Impression/Plan


Impression/Plan: 





Problem List 





(1) Testicle pain


Code(s): N50.819 - TESTICULAR PAIN, UNSPECIFIED   





(2) Testicle swelling


Assessment/Plan: 


may apple ice to scrotum


elevate testicles


Code(s): N50.89 - OTHER SPECIFIED DISORDERS OF THE MALE GENITAL ORGANS   





(3) Testicular abscess


Assessment/Plan: 


S/P I&D or right scrotal abcess


continue abx


ABD pads to collect drainage , but leave open-no tape.


Code(s): N45.4 - ABSCESS OF EPIDIDYMIS OR TESTIS   





(4) HTN (hypertension)


Assessment/Plan: 


normotensive


maintain on norvasc and hctz


Code(s): I10 - ESSENTIAL (PRIMARY) HYPERTENSION   





(5) Diabetes


Assessment/Plan: 


HgbA1c 10.9


BGM AC/qHS


novolog sliding scale


restart metformin and glipizide


diabetic diet


diabetic counseling


Code(s): E11.9 - TYPE 2 DIABETES MELLITUS WITHOUT COMPLICATIONS   





(6) Hemoglobin A1c greater than 9.0%


Code(s): R73.09 - OTHER ABNORMAL GLUCOSE   





(7) Prophylactic measure


Assessment/Plan: 


FEN


diabetic diet


monitor electrolytes





DVT proph


early ambulation





Dispo


Maintain as in patient


full code


discharge planning


Code(s): Z29.9 - ENCOUNTER FOR PROPHYLACTIC MEASURES, UNSPECIFIED   











Visit type





- Emergency Visit


Emergency Visit: Yes


ED Registration Date: 07/04/19


Care time: The patient presented to the Emergency Department on the above date 

and was hospitalized for further evaluation of their emergent condition.





- New Patient


This patient is new to me today: Yes


Date on this admission: 07/07/19





- Critical Care


Critical Care patient: No

## 2019-07-07 RX ADMIN — INSULIN ASPART SCH UNITS: 100 INJECTION, SOLUTION INTRAVENOUS; SUBCUTANEOUS at 17:34

## 2019-07-07 RX ADMIN — INSULIN ASPART SCH UNITS: 100 INJECTION, SOLUTION INTRAVENOUS; SUBCUTANEOUS at 14:11

## 2019-07-07 RX ADMIN — VANCOMYCIN HYDROCHLORIDE SCH MLS/2 HR: 1.25 INJECTION, POWDER, LYOPHILIZED, FOR SOLUTION INTRAVENOUS at 14:11

## 2019-07-07 RX ADMIN — INSULIN ASPART SCH UNITS: 100 INJECTION, SOLUTION INTRAVENOUS; SUBCUTANEOUS at 06:52

## 2019-07-07 RX ADMIN — HYDROCHLOROTHIAZIDE SCH MG: 12.5 CAPSULE ORAL at 09:18

## 2019-07-07 RX ADMIN — INSULIN ASPART SCH UNITS: 100 INJECTION, SOLUTION INTRAVENOUS; SUBCUTANEOUS at 21:58

## 2019-07-07 RX ADMIN — GLIPIZIDE SCH MG: 10 TABLET, FILM COATED, EXTENDED RELEASE ORAL at 06:51

## 2019-07-07 RX ADMIN — METFORMIN HYDROCHLORIDE SCH MG: 500 TABLET ORAL at 06:51

## 2019-07-07 RX ADMIN — METFORMIN HYDROCHLORIDE SCH MG: 500 TABLET ORAL at 16:54

## 2019-07-07 RX ADMIN — AMLODIPINE BESYLATE SCH MG: 10 TABLET ORAL at 09:18

## 2019-07-07 NOTE — PN
Physical Exam: 


History of Present Illness: 


42yo M hx DM, HTN, and obesity c/o R testicular swelling x8 days. Pt popped a 

small pimple on his R scrotum 9 days ago with a needle (cleaned with alcohol). 

The following day his R scrotum swelled. His PCP prescirbed him 10 days of Amox-

Clav 875-125 2x/day 7 days ago which pt has been taking consistently. Pt states 

the swelling and inflammation has decreased a little but it's become hard. 

Endorses pain when standing 2/2 weight and pain with palpation of the R scrotum

, swelling, mild redness and itching, and R groin pain. Denies penile pain, 

penile discharge, dysuria, hematuria, black/blue discoloration, abdominal pain, 

new erectile dysfunction (has hx of ED), STDs (last sex 2-3mo ago), other cysts/

abscesses/bumps, hx of cysts/abscesses, hx of similar sx, recent abx use, F/C, N

/V/D. Wife states pt was sweating one night a few nights ago so he possibly had 

a fever. Pt has tried tylenol and advil with minimal relief.











SUBJECTIVE: Patient seen and examined. Pt walking around. No signs or symptoms 

of distress. 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 

business. Spent time with the patient discussing the importance of controlling 

his DM especially concerning healing. PT denies SHOB, Fever, Chills, Pain, N/V/D








OBJECTIVE:





 Vital Signs











 Period  Temp  Pulse  Resp  BP Sys/Escobar  Pulse Ox


 


 Last 24 Hr  97.7 F-98.7 F  80-98  18-20  111-124/72-84  98











GENERAL: The patient is awake, alert, and fully oriented, in no acute distress.


HEAD: Normal with no signs of trauma.


EYES: PERRL, extraocular movements intact, sclera anicteric, conjunctiva clear. 

No ptosis. 


ENT: Ears normal, nares patent, oropharynx clear without exudates, moist mucous 


membranes.


NECK: Trachea midline, full range of motion, supple. 


LUNGS: Breath sounds equal, clear to auscultation bilaterally, no wheezes, no 

crackles, no 


accessory muscle use. 


HEART: Regular rate and rhythm, S1, S2.


ABDOMEN: Soft, nontender, nondistended, normoactive bowel sounds, no guarding, 

no 


rebound, no hepatosplenomegaly, no masses.


EXTREMITIES: 2+ pulses, warm, well-perfused, no edema. 


NEUROLOGICAL: Alert and Oriented x 3.  Normal speech, gait not 


observed.


PSYCH: Normal mood, normal affect.


SKIN: Warm, dry, normal turgor, no rashes or lesions noted














 Laboratory Results - last 24 hr











  07/06/19 07/06/19 07/06/19





  11:13 18:11 22:42


 


POC Glucometer  261  246  225














  07/07/19





  06:46


 


POC Glucometer  166








Active Medications











Generic Name Dose Route Start Last Admin





  Trade Name Freq  PRN Reason Stop Dose Admin


 


Amlodipine Besylate  10 mg  07/05/19 10:00  07/07/19 09:18





  Norvasc -  PO   10 mg





  DAILY [facility]   Administration





     





     





     





     


 


Glipizide  10 mg  07/05/19 07:00  07/07/19 06:51





  Glucotrol Xl -  PO   10 mg





  0700 [facility]   Administration





     





     





     





     


 


Hydrochlorothiazide  12.5 mg  07/05/19 10:00  07/07/19 09:18





  Hctz -  PO   12.5 mg





  DAILY [facility]   Administration





     





     





     





     


 


Vancomycin HCl 1,250 mg/  250 mls @ 250 mls/2 hr  07/05/19 13:00  07/06/19 15:15





  Dextrose  IVPB   250 mls/2 hr





  Q24H [facility]   Administration





     





     





  Protocol   





     


 


Insulin Aspart  1 vial  07/04/19 16:30  07/07/19 06:52





  Novolog Vial Sliding Scale -  SQ   4 units





  [facility]   Administration





     





     





  Protocol   





     


 


Ketorolac Tromethamine  30 mg  07/04/19 16:54  07/04/19 20:35





  Toradol Injection -  IVPUSH   30 mg





  Q6H PRN   Administration





  PAIN LEVEL 4 - 6   





     





     





     


 


Metformin HCl  1,000 mg  07/05/19 16:30  07/07/19 06:51





  Glucophage -  PO   1,000 mg





  BIDAC [facility]   Administration





     





     





     





     











ASSESSMENT/PLAN:





(1) Testicle pain


Code(s): N50.819 - TESTICULAR PAIN, UNSPECIFIED   





(2) Testicle swelling


Assessment/Plan: 


may apple ice to scrotum


elevate testicles


Code(s): N50.89 - OTHER SPECIFIED DISORDERS OF THE MALE GENITAL ORGANS   





(3) Testicular abscess


Assessment/Plan: 


S/P I&D or right scrotal abcess


Continue Vanc and DC Zosyn per ID


ABD pads to collect drainage , but leave open-no tape.


Code(s): N45.4 - ABSCESS OF EPIDIDYMIS OR TESTIS   





(4) HTN (hypertension)


Assessment/Plan: 


normotensive


maintain on norvasc and hctz


Code(s): I10 - ESSENTIAL (PRIMARY) HYPERTENSION   





(5) Diabetes


HgbA1c 10.9


BGM AC/qHS


novolog sliding scale


On metformin and glipizide


Start on 10 units of Levemir HS


diabetic diet


diabetic counseling


Code(s): E11.9 - TYPE 2 DIABETES MELLITUS WITHOUT COMPLICATIONS   





(6) Hemoglobin A1c greater than 9.0%


Code(s): R73.09 - OTHER ABNORMAL GLUCOSE   





(7) Prophylactic measure


Assessment/Plan: 


FEN


diabetic diet


monitor electrolytes





DVT proph


early ambulation





Dispo


Maintain as in patient


full code


discharge planning


Code(s): Z29.9 - ENCOUNTER FOR PROPHYLACTIC MEASURES, UNSPECIFIED   














Visit type





- Emergency Visit


Emergency Visit: Yes


ED Registration Date: 07/04/19


Care time: The patient presented to the Emergency Department on the above date 

and was hospitalized for further evaluation of their emergent condition.





- New Patient


This patient is new to me today: Yes


Date on this admission: 07/07/19





- Critical Care


Critical Care patient: No

## 2019-07-08 VITALS — HEART RATE: 96 BPM | TEMPERATURE: 98.1 F | SYSTOLIC BLOOD PRESSURE: 124 MMHG | DIASTOLIC BLOOD PRESSURE: 77 MMHG

## 2019-07-08 LAB
ALBUMIN SERPL-MCNC: 3.3 G/DL (ref 3.4–5)
ALP SERPL-CCNC: 133 U/L (ref 45–117)
ALT SERPL-CCNC: 43 U/L (ref 13–61)
ANION GAP SERPL CALC-SCNC: 9 MMOL/L (ref 8–16)
AST SERPL-CCNC: 23 U/L (ref 15–37)
BASOPHILS # BLD: 0.8 % (ref 0–2)
BILIRUB SERPL-MCNC: 0.5 MG/DL (ref 0.2–1)
BUN SERPL-MCNC: 15.4 MG/DL (ref 7–18)
CALCIUM SERPL-MCNC: 9.2 MG/DL (ref 8.5–10.1)
CHLORIDE SERPL-SCNC: 102 MMOL/L (ref 98–107)
CO2 SERPL-SCNC: 27 MMOL/L (ref 21–32)
CREAT SERPL-MCNC: 0.9 MG/DL (ref 0.55–1.3)
DEPRECATED RDW RBC AUTO: 14 % (ref 11.9–15.9)
EOSINOPHIL # BLD: 0.8 % (ref 0–4.5)
GLUCOSE SERPL-MCNC: 193 MG/DL (ref 74–106)
HCT VFR BLD CALC: 38.7 % (ref 35.4–49)
HGB BLD-MCNC: 12.9 GM/DL (ref 11.7–16.9)
LYMPHOCYTES # BLD: 22.3 % (ref 8–40)
MAGNESIUM SERPL-MCNC: 2 MG/DL (ref 1.8–2.4)
MCH RBC QN AUTO: 27.4 PG (ref 25.7–33.7)
MCHC RBC AUTO-ENTMCNC: 33.4 G/DL (ref 32–35.9)
MCV RBC: 81.9 FL (ref 80–96)
MONOCYTES # BLD AUTO: 5.2 % (ref 3.8–10.2)
NEUTROPHILS # BLD: 70.9 % (ref 42.8–82.8)
PLATELET # BLD AUTO: 288 K/MM3 (ref 134–434)
PMV BLD: 7.8 FL (ref 7.5–11.1)
POTASSIUM SERPLBLD-SCNC: 4.3 MMOL/L (ref 3.5–5.1)
PROT SERPL-MCNC: 7.6 G/DL (ref 6.4–8.2)
RBC # BLD AUTO: 4.72 M/MM3 (ref 4–5.6)
SODIUM SERPL-SCNC: 137 MMOL/L (ref 136–145)
WBC # BLD AUTO: 7.7 K/MM3 (ref 4–10)

## 2019-07-08 RX ADMIN — AMLODIPINE BESYLATE SCH MG: 10 TABLET ORAL at 09:19

## 2019-07-08 RX ADMIN — HYDROCHLOROTHIAZIDE SCH MG: 12.5 CAPSULE ORAL at 09:19

## 2019-07-08 RX ADMIN — GLIPIZIDE SCH MG: 10 TABLET, FILM COATED, EXTENDED RELEASE ORAL at 06:16

## 2019-07-08 RX ADMIN — VANCOMYCIN HYDROCHLORIDE SCH MLS/2 HR: 1.25 INJECTION, POWDER, LYOPHILIZED, FOR SOLUTION INTRAVENOUS at 12:19

## 2019-07-08 RX ADMIN — INSULIN ASPART SCH UNITS: 100 INJECTION, SOLUTION INTRAVENOUS; SUBCUTANEOUS at 06:16

## 2019-07-08 RX ADMIN — INSULIN ASPART SCH UNITS: 100 INJECTION, SOLUTION INTRAVENOUS; SUBCUTANEOUS at 11:39

## 2019-07-08 RX ADMIN — METFORMIN HYDROCHLORIDE SCH MG: 500 TABLET ORAL at 06:16

## 2019-07-08 NOTE — PN
Progress Note, Physician


History of Present Illness: 





stable


no new isues


doing well





- Current Medication List


Current Medications: 


Active Medications





Amlodipine Besylate (Norvasc -)  10 mg PO DAILY [facility]


   Last Admin: 07/08/19 09:19 Dose:  10 mg


Glipizide (Glucotrol Xl -)  10 mg PO 0700 [facility]


   Last Admin: 07/08/19 06:16 Dose:  10 mg


Hydrochlorothiazide (Hctz -)  12.5 mg PO DAILY [facility]


   Last Admin: 07/08/19 09:19 Dose:  12.5 mg


Vancomycin HCl 1,250 mg/ (Dextrose)  250 mls @ 250 mls/2 hr IVPB Q24H [facility]; 

Protocol


   Last Admin: 07/08/19 12:19 Dose:  250 mls/2 hr


Insulin Aspart (Novolog Vial Sliding Scale -)  1 vial SQ ACHS [facility]; Protocol


   Last Admin: 07/08/19 11:39 Dose:  4 units


Insulin Detemir (Levemir Vial)  10 units SQ HS [facility]


   Last Admin: 07/07/19 21:58 Dose:  10 units


Insulin Detemir (Levemir Vial)  8 units SQ 0800 [facility]


Metformin HCl (Glucophage -)  1,000 mg PO BIDAC [facility]


   Last Admin: 07/08/19 06:16 Dose:  1,000 mg











- Objective


Vital Signs: 


 Vital Signs











Temperature  97.8 F   07/08/19 09:00


 


Pulse Rate  84   07/08/19 09:00


 


Respiratory Rate  18   07/08/19 09:00


 


Blood Pressure  114/73   07/08/19 09:00


 


O2 Sat by Pulse Oximetry (%)  99   07/08/19 09:00











Constitutional: Yes: No Distress, Calm


Cardiovascular: Yes: Regular Rate and Rhythm


Respiratory: Yes: Regular, CTA Bilaterally


Gastrointestinal: Yes: Normal Bowel Sounds, Soft


Genitourinary: Yes: Other (scrotal swelling better)


Musculoskeletal: Yes: WNL


Extremities: Yes: WNL


Neurological: Yes: Alert, Oriented


Psychiatric: Yes: Alert, Oriented


Labs: 


 CBC, BMP





 07/08/19 09:48 





 07/08/19 08:18 





 INR, PTT











INR  1.06  (0.83-1.09)   07/05/19  06:31    














Assessment/Plan





Problem List





- Problems


(1) Testicle pain


Code(s): N50.819 - TESTICULAR PAIN, UNSPECIFIED   





(2) Testicle swelling


Code(s): N50.89 - OTHER SPECIFIED DISORDERS OF THE MALE GENITAL ORGANS   





(3) Testicular abscess


Code(s): N45.4 - ABSCESS OF EPIDIDYMIS OR TESTIS   





(4) HTN (hypertension)


Code(s): I10 - ESSENTIAL (PRIMARY) HYPERTENSION   





(5) Diabetes


Code(s): E11.9 - TYPE 2 DIABETES MELLITUS WITHOUT COMPLICATIONS   





(6) Hemoglobin A1c greater than 9.0%


Code(s): R73.09 - OTHER ABNORMAL GLUCOSE   








plan


continue abx


will change to po


d/w the team


wound care

## 2019-07-08 NOTE — DS
Physical Examination


Vital Signs: 


 Vital Signs











Temperature  97.8 F   07/08/19 09:00


 


Pulse Rate  84   07/08/19 09:00


 


Respiratory Rate  18   07/08/19 09:00


 


Blood Pressure  114/73   07/08/19 09:00


 


O2 Sat by Pulse Oximetry (%)  99   07/08/19 09:00











Constitutional: Yes: Well Nourished, No Distress, Calm


Eyes: Yes: WNL, Conjunctiva Clear, EOM Intact


HENT: Yes: WNL, Atraumatic, Normocephalic


Neck: Yes: WNL, Supple, Trachea Midline


Cardiovascular: Yes: WNL, Regular Rate and Rhythm


Respiratory: Yes: WNL, Regular, CTA Bilaterally


Gastrointestinal: Yes: WNL, Normal Bowel Sounds, Soft


Renal/: Yes: WNL


Musculoskeletal: Yes: WNL


Extremities: Yes: WNL


Edema: No


Peripheral Pulses WNL: Yes


Integumentary: Yes: WNL


Wound/Incision: Yes: Dressing Dry and Intact


Neurological: Yes: WNL, Alert, Oriented


...Motor Strength: WNL


Psychiatric: Yes: WNL, Alert, Oriented


Labs: 


 CBC, BMP





 07/08/19 09:48 





 07/08/19 08:18 











Discharge Summary


Reason For Visit: ABSCESS


Current Active Problems





Abscess (Acute)


Diabetes (Acute)


HTN (hypertension) (Acute)


Hemoglobin A1c greater than 9.0% (Acute)


Prophylactic measure (Acute)


Testicle pain (Acute)


Testicle swelling (Acute)


Testicular abscess (Acute)








Procedures: Principal: Scrotal Incision and Drainage 7/4/2019


Hospital Course: 





Problem List





S/P incision and drainage for scrotal abcess





- Problems


(1) Testicle pain/Testicle swelling


Assessment/Plan: 


may apple ice to scrotum


elevate testicles





(3) Testicular abscess


Assessment/Plan: 


S/P I&D or right scrotal abcess


Wound culture grew staph and blood culture with strep. Traeted with IV vanco x 

4 days. Will continue augmentin and clindamyicn for 10 days


ABD pads to collect drainage , but leave open-no tape. 








(4) HTN (hypertension)


Assessment/Plan: 


maintain on norvasc and hctz


Code(s): I10 - ESSENTIAL (PRIMARY) HYPERTENSION   





(5) Diabetes


Assessment/Plan: 


HgbA1c 10.9


BGM AC/qHS


Levemir started 8un in am 10u in pm with better glycemic control. 


novolog sliding scale


continue metformin and glipizide


diabetic diet


diabetic counseling


  





Condition: Improved





- Instructions


Diet, Activity, Other Instructions: 


Mr Brown





An incision and drainage was performed to your right scrotal area. The drainage 

was sent to the lad and 2 different types of bacteria resulted. You need to 

take 2 antibiotics for a total of 10 days.





AUGMENTIN 875mg twice a day for 10 days


CLINDAMYCIN 300 MG three times a day for 10 days





Please do not miss any doses





Your blood sugar was very high when you came into the hospital. You were 

started on an injectable for of insulin called 


LEVEMIR


in addition to your glucophage and metformin.





Make an appointment with your primary doctor to follow your blood sugars after 

being started on the LEVEMIR





Check for blood sugars before every meal and at bedtime.





Avoid concentrated sugars and follow a diabetic diet. Information of diet 

attached.








Here is some information of the 2 types of insulin you will be taking.











   Levemir (Insulin Detemir)


   Levemir is a long-acting (basal) insulin analog manufactured by [employer]. Its generic name is insulin detemir. Levemir is designed to provide 24-

hour blood sugar control and to lower the A1C in those with type 1 and type 2 

diabetes.


   Insulin detemir is created by recombinant DNA technology and is produced by 

bakers yeast. It also contains zinc, mannitol, hydrochloric acid or sodium 

hydroxide to adjust its pH level, and other chemicals.


   Levemir was first approved by the FDA in June of 2005. It then received two 

more approvals in 2012: for use by pregnant women and in children 2 to 5 years 

old with type 1 diabetes.


   How Does Levemir Work?


   Levemir starts to work on bringing down your blood sugar levels a few hours 

after injection. Peak concentration occurs in about six to eight hours after 

injection. Levemir is designed to keep working for around 24 hours (and to stay 

at close to peak levels for that amount of time), but some people clear the 

medication out of their bodies more quickly.


   As a long-acting insulin, Levemir can be injected either once or twice daily.


How quickly your body processes and clears Levemir will determine how often you 

will need to inject this insulin. When you first start Levemir, youll need to 

test your blood sugars frequently and work with your doctor to determine your 

ideal dosage and injection frequency. Some people have even reported on 

diabetes forums that its necessary to split their Levemir dose into three or 

four injections a day, in order to get the best blood glucose control.


          How Should Levemir Be Used?


Levemir is administered by injection, with the use of a prefilled pen device 

called a FlexTouch. This device has been designed to require less force to push 

the injection button, resulting in less discomfort. Unlike other insulin pens, 

the FlexTouch can remain unrefrigerated (once opened) for up to 42 days in 

temperatures 86 degrees Fahrenheit or cooler.


Levemir is also available in vials and can be injected using syringes. Once 

opened, vials must be kept below 86 degrees Fahrenheit for up to 42 days.


If your doctor has prescribed a once-daily dose of Levemir, then make your 

evening meal or your bedtime your regular injection time.


If youre on a twice-daily injection regimen, give yourself at least twelve 

hours between doses.


Levemir should not be used in an insulin pump, mixed with other insulins, or 

injected into a vein or muscle.


         What Are the Side Effects of Levemir?


Hypoglycemia is a common side effect of all types of insulin. Know the signs 

and how to treat a low blood sugar episode. Also be sure your family, friends, 

and caretakers understand how to recognize and treat hypoglycemia, in case you 

are unable to do so yourself.


Other common side effects of Levemir include:


   Itching or a mild skin rash 


   Thickening or hollowing of the skin at your injection site. Always rotate 

injection sites to prevent tissue damage. 


   Low blood potassium levels 


   Increased heart rate 


   Tiredness 


   Headache 


   Confusion 


   Hunger 


   Nausea 


   Muscle weakness 


   Blurry vision





   Insulin and Diabetes





Types of Insulin


For people who need to take external or supplemental insulin (insulin your body 

did not produce but that was instead made by a pharmaceutical company), there 

are several different types and kinds of insulin. The insulin you take will 

depend on your personal needs. Different types of insulin work differently in 

different people.


Heres a chart of how the types of insulin work to replicate the normal 

pancreatic delivery of insulin and how they are typically used.





   Rapid-acting insulin analogs (Insulin Aspart, Insulin Lispro, Insulin 

Glulisine): Usually taken as a bolus before a meal to cover the blood glucose 

elevation from eating or to correct for high blood glucose. This type of 

insulin is often used with longer-acting insulin, which is used to cover the 

bodys metabolic need for insulin. 


   Short-acting synthetic human insulin (Regular): Usually taken as a bolus 

about 30 minutes before a meal to cover the blood glucose elevation from eating 

or to correct for high blood glucose. Short-acting insulin is different 

different from Rapid-acting in its Onset and Peak. This type of insulin is 

often used with longer-acting insulin, which is used to cover the bodys 

metabolic need for insulin. 


   Intermediate-acting synthetic human insulin (NPH): Usually taken twice a day 

as a combination bolus and basal insulin. This type of insulin is often 

combined with Rapid-acting or Short-acting insulin to cover meals before and/or 

after its Peak. 


   Long-acting insulin analogs (Insulin Glargine, Insulin Detemir): Usually 

taken once or twice a day as a basal insulin to cover the bodys metabolic need 

for insulin. This type of insulin is often combined, when needed, with Rapid-

acting or Short-acting insulin as a bolus before meals or to correct for high 

blood glucose.


Bolus Insulin


Bolus insulin, or a bolus refers to insulin that is fast acting and is given 

to cover the carbohydrates in a meal or to bring down high blood glucose. Bolus 

insulin include Humalog, Novolog and Apidra.


Basal Insulin


Basal insulin refers to insulin that is long acting and used to to keep blood 

sugar stable in between meal and correction boluses and at night. The body 

needs some insulin even when no food is being consumed to fuel your brain and 

essential organs. As a result, a longer acting insulin is typically combined 

with a fast acting in order to manage blood sugars throughout all hours of the 

day and night. Basal insulin include Lantus, Levemir, Tresiba and Toujeo.


Inhalable Insulin


There is currently one type of inhalable insulin on the market called Afrezza. 

Inhalable insulin is a man-made insulin with is inhaled through the nostrils by 

way of a nebulizer device (like asthma medications). The [facility] says it is 

considered a mealtime insulin and is to be taken at the start of a meal.


Buying and Storing Insulin


In the [place], Regular and NPH insulin types are available without a 

prescription (as are syringes). All other types of insulin require a 

prescription for purchase and can be bought at most any pharmacy.


Many insurance plans offer a 3 month mail order service you can purchase a 3 

month supply of insulin with. This may amount to a time and financial savings 

of insulin purchases.


Unopened insulin needs to be stored in the refrigerator. Once opened, Regular 

and NPH insulin last approximately two weeks outside of refrigeration (check 

drug insert for specifics) and the other insulin types last approximately one 

month outside of refrigeration. In that time, insulin must not get too hot or 

cold.


The American Diabetes Association has tips on storing insulin:


   Do not store your insulin near extreme heat or extreme cold. 


   Never store insulin in the freezer, direct sunlight, or in the glove 

compartment of a car. 


   Check the expiration date before using, and dont use any insulin beyond its 

expiration date. 


   Examine the bottle closely to make sure the insulin looks normal before you 

draw the insulin into the syringe.


Using Insulin


In people without diabetes, the body makes and releases insulin in an efficient 

way with exact doses. In people with diabetes who require the use of external 

insulin, a person must either administer their own insulin with a syringe, pen, 

pod, or pump.


When carbohydrates are consumed, those carbohydrates are digested and broken 

down into glucose (sugar) in the blood. Insulin enables that glucose to:


   enter a cell in the body and be used instantly for energy 


   be converted into glycogen and stored in the muscles or liver 


   be stored as body fat because the body doesnt need it instantly for energy, 

and the glycogen storages are full





Without enough insulin present in the body, you will experience high blood 

sugars, also known as hyperglycemia. For a person who has not yet been 

diagnosed with diabetes, a high blood sugar is the number one sign and can be 

measured easily at the doctors office with a simple blood test. A high blood 

sugar is considered anything above 130 mg/dL or 7.2 mmol/L. For a person to be 

diagnosed with diabetes, the duration and timing of high blood sugars are 

important factors for diagnosis rather than just one random finger stick 

reading.


Too much insulin in the body can lead to low blood sugars, also known as 

hypoglycemia. Low blood sugars are any measurement on your glucose meter below 

70 mg/dL or 3.9 mmol/L, and it should be treated quickly with a form of easy-to-

digest carbohydrate like juice or glucose tabs


Referrals: 


Baljinder Mullins MD [Staff Physician] - 1 Month (Diabetes management)


Rich Tamayo MD [Staff Physician] - 1 Month (Urology)





- Home Medications


Comprehensive Discharge Medication List: 


Ambulatory Orders





Glipizide [Glipizide ER] 10 mg PO DAILY 07/04/19 


Alcohol Antiseptic Pads [Alcohol Swabs] 1 each TP ASDIR #500 med..pad 07/08/19 


Amlodipine Besylate 10 mg PO DAILY #30 tablet 07/08/19 


Amox-Tr/K Cl [Augmentin 875-125mg Tablet -] 1 tab PO BID@0800,1730 #20 tablet 07 /08/19 


Clindamycin [Cleocin -] 300 mg PO TID #30 capsule 07/08/19 


Glipizide Xl [Glucotrol Xl -] 10 mg PO 0700 #30 tab.er.24 07/08/19 


Hydrochlorothiazide [Hctz -] 12.5 mg PO DAILY #30 cap 07/08/19 


Insulin (Levemir) [Levemir Vial] 8 units SQ 0800 #100 units 07/08/19 


Insulin (Levemir) [Levemir Vial] 10 units SQ HS #100 units 07/08/19 


Insulin Sliding Scale [Novolog Vial Sliding Scale -] 1 vial SQ ACHS #100 units 

07/08/19 


Insulin Sliding Scale [Novolog Vial Sliding Scale -] 100 vial SQ ACHS #100 

units 07/08/19 


Metformin HCl [Glucophage] 1,000 mg PO BID #60 tablet 07/08/19 


Miscellaneous Medical Supply [Glucometer Device] 1 each .ROUTE ASDIR #1 kit 07/ 08/19 


Miscellaneous Medical Supply [Glucometer Test Strips #100] 1 each .ROUTE ASDIR #

1 box 07/08/19 


Syring-Needl,Disp,Insul,0.3 ml [Insulin Syringe] 1 each  ASDIR #100 

disp.syrin 07/08/19 








This patient is new to me today: No


Emergency Visit: Yes


ED Registration Date: 07/04/19


Care time: The patient presented to the Emergency Department on the above date 

and was hospitalized for further evaluation of their emergent condition.


Critical Care patient: No





- Discharge Referral


Referred to [facility] No

## 2019-07-08 NOTE — PN
Progress Note, Physician


History of Present Illness: 





42yo M hx DM, HTN, and obesity c/o R testicular swelling x8 days. Pt popped a 

small pimple on his R scrotum 9 days ago with a needle (cleaned with alcohol). 

The following day his R scrotum swelled. His PCP prescirbed him 10 days of Amox-

Clav 875-125 2x/day 7 days ago which pt has been taking consistently. Pt states 

the swelling and inflammation has decreased a little but it's become hard. 

Endorses pain when standing 2/2 weight and pain with palpation of the R scrotum

, swelling, mild redness and itching, and R groin pain. Denies penile pain, 

penile discharge, dysuria, hematuria, black/blue discoloration, abdominal pain, 

new erectile dysfunction (has hx of ED), STDs (last sex 2-3mo ago), other cysts/

abscesses/bumps, hx of cysts/abscesses, hx of similar sx, recent abx use, F/C, N

/V/D. Wife states pt was sweating one night a few nights ago so he possibly had 

a fever. Pt has tried tylenol and advil with minimal relief.





- Current Medication List


Current Medications: 


Active Medications





Amlodipine Besylate (Norvasc -)  10 mg PO DAILY [facility]


   Last Admin: 07/07/19 09:18 Dose:  10 mg


Glipizide (Glucotrol Xl -)  10 mg PO 0700 [facility]


   Last Admin: 07/08/19 06:16 Dose:  10 mg


Hydrochlorothiazide (Hctz -)  12.5 mg PO DAILY [facility]


   Last Admin: 07/07/19 09:18 Dose:  12.5 mg


Vancomycin HCl 1,250 mg/ (Dextrose)  250 mls @ 250 mls/2 hr IVPB Q24H [facility]; 

Protocol


   Last Admin: 07/07/19 14:11 Dose:  250 mls/2 hr


Insulin Aspart (Novolog Vial Sliding Scale -)  1 vial SQ ACHS [facility]; Protocol


   Last Admin: 07/08/19 06:16 Dose:  4 units


Insulin Detemir (Levemir Vial)  10 units SQ HS [facility]


   Last Admin: 07/07/19 21:58 Dose:  10 units


Ketorolac Tromethamine (Toradol Injection -)  30 mg IVPUSH Q6H PRN


   PRN Reason: PAIN LEVEL 4 - 6


   Last Admin: 07/04/19 20:35 Dose:  30 mg


Metformin HCl (Glucophage -)  1,000 mg PO BIDAC [facility]GIE


   Last Admin: 07/08/19 06:16 Dose:  1,000 mg











- Objective


Vital Signs: 


 Vital Signs











Temperature  98 F   07/08/19 06:00


 


Pulse Rate  85   07/08/19 06:00


 


Respiratory Rate  18   07/08/19 06:00


 


Blood Pressure  120/76   07/08/19 06:00


 


O2 Sat by Pulse Oximetry (%)  98   07/07/19 09:00











Labs: 


 CBC, BMP





 07/06/19 06:55 





 07/06/19 06:55 





 INR, PTT











INR  1.06  (0.83-1.09)   07/05/19  06:31    














Problem List





- Problems


(1) Testicle pain


Code(s): N50.819 - TESTICULAR PAIN, UNSPECIFIED   





(2) Testicle swelling


Code(s): N50.89 - OTHER SPECIFIED DISORDERS OF THE MALE GENITAL ORGANS   





(3) Testicular abscess


Code(s): N45.4 - ABSCESS OF EPIDIDYMIS OR TESTIS   





(4) HTN (hypertension)


Code(s): I10 - ESSENTIAL (PRIMARY) HYPERTENSION   





(5) Diabetes


Code(s): E11.9 - TYPE 2 DIABETES MELLITUS WITHOUT COMPLICATIONS   





(6) Hemoglobin A1c greater than 9.0%


Code(s): R73.09 - OTHER ABNORMAL GLUCOSE   





(7) Prophylactic measure


Code(s): Z29.9 - ENCOUNTER FOR PROPHYLACTIC MEASURES, UNSPECIFIED

## 2020-11-01 NOTE — PN
Progress Note, Physician


History of Present Illness: 





Pt states he feels well, remains afebrile. No pain at surgical site.





- Current Medication List


Current Medications: 


Active Medications





Amlodipine Besylate (Norvasc -)  10 mg PO DAILY [facility]


   Last Admin: 07/07/19 09:18 Dose:  10 mg


Glipizide (Glucotrol Xl -)  10 mg PO 0700 [facility]


   Last Admin: 07/07/19 06:51 Dose:  10 mg


Hydrochlorothiazide (Hctz -)  12.5 mg PO DAILY [facility]


   Last Admin: 07/07/19 09:18 Dose:  12.5 mg


Vancomycin HCl 1,250 mg/ (Dextrose)  250 mls @ 250 mls/2 hr IVPB Q24H [facility]; 

Protocol


   Last Admin: 07/07/19 14:11 Dose:  250 mls/2 hr


Insulin Aspart (Novolog Vial Sliding Scale -)  1 vial SQ ACHS [facility]; Protocol


   Last Admin: 07/07/19 14:11 Dose:  4 units


Insulin Detemir (Levemir Vial)  10 units SQ HS [facility]


Ketorolac Tromethamine (Toradol Injection -)  30 mg IVPUSH Q6H PRN


   PRN Reason: PAIN LEVEL 4 - 6


   Last Admin: 07/04/19 20:35 Dose:  30 mg


Metformin HCl (Glucophage -)  1,000 mg PO BIDAC [facility]


   Last Admin: 07/07/19 06:51 Dose:  1,000 mg











- Objective


Vital Signs: 


 Vital Signs











Temperature  98.1 F   07/07/19 14:22


 


Pulse Rate  94 H  07/07/19 14:22


 


Respiratory Rate  18   07/07/19 14:22


 


Blood Pressure  116/90   07/07/19 14:22


 


O2 Sat by Pulse Oximetry (%)  98   07/06/19 21:00











Constitutional: Yes: No Distress, Calm


Cardiovascular: Yes: Regular Rate and Rhythm


Respiratory: Yes: Regular


Gastrointestinal: Yes: Normal Bowel Sounds, Soft


Integumentary: Yes: WNL


Wound/Incision: Yes: Other (Lt testicle with decreased erythema/edema, still 

with mild induration, no tenderness, incision site with mild sanguinous 

discharge)


Neurological: Yes: Alert, Oriented


Labs: 


 CBC, BMP





 07/06/19 06:55 





 07/06/19 06:55 





 INR, PTT











INR  1.06  (0.83-1.09)   07/05/19  06:31    








 Microbiology





07/04/19 20:00   Scrotum   Gram Stain - Final


07/04/19 20:00   Scrotum   Wound Culture - Preliminary


                            Staphylococcus Coagulase Neg


                            Staphylococcus Coagulase Neg#2


                            Strep Agalactiae Group B


07/04/19 09:51   Blood - Peripheral Venous   Blood Culture - Preliminary


                            NO GROWTH OBTAINED AFTER 72 HOURS, INCUBATION TO 

CONTINUE


                            FOR 2 DAYS.


07/04/19 09:51   Blood - Peripheral Venous   Blood Culture - Final


                            Staphylococcus Epidermidis


07/06/19 08:05   Blood - Peripheral Venous   Blood Culture - Preliminary


                            NO GROWTH OBTAINED AFTER 24 HOURS, INCUBATION TO 

CONTINUE


                            FOR 4 DAYS.


07/06/19 06:55   Blood - Peripheral Venous   Blood Culture - Preliminary


                            NO GROWTH OBTAINED AFTER 24 HOURS, INCUBATION TO 

CONTINUE


                            FOR 4 DAYS.


07/04/19 11:04   Urine - Urine Clean Catch   Urine Culture - Final


                            NO GROWTH OBTAINED











Problem List





- Problems


(1) Diabetes


Code(s): E11.9 - TYPE 2 DIABETES MELLITUS WITHOUT COMPLICATIONS   





(2) HTN (hypertension)


Code(s): I10 - ESSENTIAL (PRIMARY) HYPERTENSION   





(3) Testicular abscess


Code(s): N45.4 - ABSCESS OF EPIDIDYMIS OR TESTIS   





Assessment/Plan





Testicular abscess s/p I+D


Uncontrolled DM


HTN





-- Initial Blood cultures and wound cultures with coag neg. staph (+Strep in 

wound cultures) , repeat blood cultures no growth so far


-- continue Vancomycin IV, monitor for continued improvement


-- wound care Pt discharged, pt ambulatory. Discharge instructions reviewed, all questions answered. IV removed.